# Patient Record
Sex: FEMALE | Race: WHITE
[De-identification: names, ages, dates, MRNs, and addresses within clinical notes are randomized per-mention and may not be internally consistent; named-entity substitution may affect disease eponyms.]

---

## 2017-01-25 ENCOUNTER — HOSPITAL ENCOUNTER (OUTPATIENT)
Dept: HOSPITAL 53 - M SFHCPLAZ | Age: 67
End: 2017-01-25
Attending: DERMATOLOGY
Payer: COMMERCIAL

## 2017-01-25 DIAGNOSIS — L57.0: Primary | ICD-10-CM

## 2017-05-03 ENCOUNTER — HOSPITAL ENCOUNTER (OUTPATIENT)
Dept: HOSPITAL 53 - M WHC | Age: 67
End: 2017-05-03
Payer: COMMERCIAL

## 2017-05-03 DIAGNOSIS — M85.9: ICD-10-CM

## 2017-05-03 DIAGNOSIS — C50.919: Primary | ICD-10-CM

## 2017-05-04 NOTE — DEXA
AP SPINE   L1 - L4   1.222   0.2      1.5

LT FEMUR   TOTAL   1.152   1.1      2.2

RT FEMUR   TOTAL   1.186   1.4      2.5

TOTAL BODY   TOTAL

OTHER



DUAL FEMUR FRAX* ASSESSMENT

Risk factors:               None.

10 year probability of fracture

Major osteoporotic fracture            5.9 %

Hip fracture               0.1 %



COMMENTS:

Normal bone densitometry of the spine and hips.

The decreased density of the spine does represent a significant change.

The decreased density of the left hip does represent a significant change.

The decreased density of the right hip does represent a significant change.

The density of the spine has decreased 2.6% since the initial exam on 12/30/
2003.

The spine density has decreased 4.8% since the most recent exam on 02/02/2012.

The density of the left hip has decreased 6.9% since the initial exam on 12/30/
2003.

The density of the left hip has decreased 9.1% since the most recent exam on 02/
02/2012.

The density of the right hip has decreased 1.6% since the initial exam on 12/30/
2003.

The density of the right hip has decreased 6.7% since the most recent exam on 02
/02/2012.



FOLLOW-UP:

Recommendation for the next bone density exam: 5 years.
[facility]IT[place]

## 2017-06-08 ENCOUNTER — HOSPITAL ENCOUNTER (OUTPATIENT)
Dept: HOSPITAL 53 - M LAB REF | Age: 67
End: 2017-06-08
Attending: NURSE PRACTITIONER
Payer: COMMERCIAL

## 2017-06-08 DIAGNOSIS — N39.0: Primary | ICD-10-CM

## 2017-08-15 ENCOUNTER — HOSPITAL ENCOUNTER (OUTPATIENT)
Dept: HOSPITAL 53 - M RAD | Age: 67
End: 2017-08-15
Attending: NURSE PRACTITIONER
Payer: COMMERCIAL

## 2017-08-15 DIAGNOSIS — N95.0: Primary | ICD-10-CM

## 2017-08-15 NOTE — REP
Pelvic ultrasound with transabdominal and endovaginal imaging:

 

Comparison 06/10/2015.

 

The bladder is incompletely distended.

 

The uterus is anteverted and normal size measuring 6.7 x 2.3 x 3.9 cm.

 

The uterus has a subseptate configuration.  The endometrium is not thickened

measuring 1.9 mm and the right cornu and 1.4 mm in the left cornu.

 

The ovaries could not be visualized.  No adnexal masses are identified.

 

Impression:

 

There is no endometrial thickening.

 

The ovaries could not be visualized.

 

Otherwise, essentially negative pelvic ultrasound.  Sign taking

 

 

Signed by

González Robledo MD 08/15/2017 02:14 P

## 2017-08-25 ENCOUNTER — HOSPITAL ENCOUNTER (OUTPATIENT)
Dept: HOSPITAL 53 - M LAB REF | Age: 67
End: 2017-08-25
Attending: UROLOGY
Payer: COMMERCIAL

## 2017-08-25 DIAGNOSIS — R39.89: Primary | ICD-10-CM

## 2017-10-19 ENCOUNTER — HOSPITAL ENCOUNTER (OUTPATIENT)
Dept: HOSPITAL 53 - M LAB REF | Age: 67
End: 2017-10-19
Attending: INTERNAL MEDICINE
Payer: COMMERCIAL

## 2017-10-19 DIAGNOSIS — Z11.59: Primary | ICD-10-CM

## 2018-06-27 ENCOUNTER — HOSPITAL ENCOUNTER (OUTPATIENT)
Dept: HOSPITAL 53 - M SDC | Age: 68
Discharge: HOME | End: 2018-06-27
Attending: OPHTHALMOLOGY
Payer: COMMERCIAL

## 2018-06-27 DIAGNOSIS — Z85.3: ICD-10-CM

## 2018-06-27 DIAGNOSIS — H25.9: Primary | ICD-10-CM

## 2018-06-27 DIAGNOSIS — Z92.21: ICD-10-CM

## 2018-06-27 DIAGNOSIS — Z79.899: ICD-10-CM

## 2018-06-27 DIAGNOSIS — Z92.3: ICD-10-CM

## 2018-06-27 PROCEDURE — 66984 XCAPSL CTRC RMVL W/O ECP: CPT

## 2018-06-27 RX ADMIN — Medication 1 EA: at 12:01

## 2018-06-27 RX ADMIN — LIDOCAINE HYDROCHLORIDE 1 ML: 10 INJECTION, SOLUTION EPIDURAL; INFILTRATION; INTRACAUDAL; PERINEURAL at 12:00

## 2018-06-27 RX ADMIN — Medication 1 EA: at 12:04

## 2018-06-27 RX ADMIN — PHENYLEPHRINE HYDROCHLORIDE 1 DROP: 25 SOLUTION/ DROPS OPHTHALMIC at 10:32

## 2018-06-27 RX ADMIN — OFLOXACIN 1 DROP: 3 SOLUTION/ DROPS OPHTHALMIC at 10:32

## 2018-06-27 RX ADMIN — POVIDONE-IODINE 1 DROP: 5 SOLUTION OPHTHALMIC at 11:57

## 2018-06-27 RX ADMIN — CYCLOPENTOLATE HYDROCHLORIDE 1 DROP: 20 SOLUTION/ DROPS OPHTHALMIC at 10:32

## 2018-06-27 RX ADMIN — TRIAMCINOLONE ACETONIDE 1 MG: 40 INJECTION, SUSPENSION OPHTHALMIC at 12:04

## 2018-06-27 RX ADMIN — ACETAZOLAMIDE 1 MG: 500 CAPSULE, EXTENDED RELEASE ORAL at 12:41

## 2018-06-27 RX ADMIN — LIDOCAINE HYDROCHLORIDE 1 %: 35 GEL OPHTHALMIC at 10:32

## 2018-06-27 RX ADMIN — Medication 1 MG: at 12:04

## 2020-02-20 ENCOUNTER — HOSPITAL ENCOUNTER (OUTPATIENT)
Dept: HOSPITAL 53 - M SDC | Age: 70
LOS: 1 days | Discharge: HOME | End: 2020-02-21
Attending: UROLOGY
Payer: COMMERCIAL

## 2020-02-20 VITALS — DIASTOLIC BLOOD PRESSURE: 68 MMHG | SYSTOLIC BLOOD PRESSURE: 137 MMHG

## 2020-02-20 VITALS — WEIGHT: 153.8 LBS | HEIGHT: 65 IN | BODY MASS INDEX: 25.62 KG/M2

## 2020-02-20 VITALS — SYSTOLIC BLOOD PRESSURE: 126 MMHG | DIASTOLIC BLOOD PRESSURE: 59 MMHG

## 2020-02-20 VITALS — SYSTOLIC BLOOD PRESSURE: 161 MMHG | DIASTOLIC BLOOD PRESSURE: 75 MMHG

## 2020-02-20 VITALS — DIASTOLIC BLOOD PRESSURE: 75 MMHG | SYSTOLIC BLOOD PRESSURE: 139 MMHG

## 2020-02-20 VITALS — SYSTOLIC BLOOD PRESSURE: 148 MMHG | DIASTOLIC BLOOD PRESSURE: 78 MMHG

## 2020-02-20 VITALS — DIASTOLIC BLOOD PRESSURE: 72 MMHG | SYSTOLIC BLOOD PRESSURE: 153 MMHG

## 2020-02-20 VITALS — DIASTOLIC BLOOD PRESSURE: 76 MMHG | SYSTOLIC BLOOD PRESSURE: 158 MMHG

## 2020-02-20 DIAGNOSIS — Z92.3: ICD-10-CM

## 2020-02-20 DIAGNOSIS — N72: ICD-10-CM

## 2020-02-20 DIAGNOSIS — Z79.899: ICD-10-CM

## 2020-02-20 DIAGNOSIS — Z85.3: ICD-10-CM

## 2020-02-20 DIAGNOSIS — N81.9: Primary | ICD-10-CM

## 2020-02-20 DIAGNOSIS — Z92.21: ICD-10-CM

## 2020-02-20 LAB
HCT VFR BLD AUTO: 40 % (ref 36–47)
HGB BLD-MCNC: 12.8 G/DL (ref 12–15.5)
MCH RBC QN AUTO: 32.2 PG (ref 27–33)
MCHC RBC AUTO-ENTMCNC: 32 G/DL (ref 32–36.5)
MCV RBC AUTO: 100.5 FL (ref 80–96)
PLATELET # BLD AUTO: 205 10^3/UL (ref 150–450)
RBC # BLD AUTO: 3.98 10^6/UL (ref 4–5.4)
WBC # BLD AUTO: 5 10^3/UL (ref 4–10)

## 2020-02-20 PROCEDURE — 57282 COLPOPEXY EXTRAPERITONEAL: CPT

## 2020-02-20 PROCEDURE — 36415 COLL VENOUS BLD VENIPUNCTURE: CPT

## 2020-02-20 PROCEDURE — 88305 TISSUE EXAM BY PATHOLOGIST: CPT

## 2020-02-20 PROCEDURE — 96361 HYDRATE IV INFUSION ADD-ON: CPT

## 2020-02-20 PROCEDURE — 85027 COMPLETE CBC AUTOMATED: CPT

## 2020-02-20 PROCEDURE — 86900 BLOOD TYPING SEROLOGIC ABO: CPT

## 2020-02-20 PROCEDURE — 86901 BLOOD TYPING SEROLOGIC RH(D): CPT

## 2020-02-20 PROCEDURE — 57260 CMBN ANT PST COLPRHY: CPT

## 2020-02-20 PROCEDURE — 96374 THER/PROPH/DIAG INJ IV PUSH: CPT

## 2020-02-20 PROCEDURE — 86850 RBC ANTIBODY SCREEN: CPT

## 2020-02-20 PROCEDURE — 58262 VAG HYST INCLUDING T/O: CPT

## 2020-02-20 RX ADMIN — SODIUM CHLORIDE, POTASSIUM CHLORIDE, SODIUM LACTATE AND CALCIUM CHLORIDE SCH MLS/HR: 600; 310; 30; 20 INJECTION, SOLUTION INTRAVENOUS at 18:48

## 2020-02-20 RX ADMIN — SODIUM CHLORIDE, POTASSIUM CHLORIDE, SODIUM LACTATE AND CALCIUM CHLORIDE SCH MLS/HR: 600; 310; 30; 20 INJECTION, SOLUTION INTRAVENOUS at 11:30

## 2020-02-20 NOTE — RO
DATE OF PROCEDURE:  02/20/2020

 

PREOPERATIVE DIAGNOSIS:  Symptomatic pelvic prolapse, failed pessary.

 

POSTOPERATIVE DIAGNOSIS: Symptomatic pelvic prolapse, failed pessary.

 

PROCEDURE: Total vaginal hysterectomy with bilateral salpingo-oophorectomy,

sacrospinous suspension using Anchorsure, anterior and posterior repair and

cystourethroscopy.

 

SURGEON: Dr. Mahnaz Acevedo

 

ASSISTANT: None.

 

ANESTHESIA: General endotracheal anesthesia.

 

DESCRIPTION OF PROCEDURE:  Virginia was brought to the operating room where

sufficient general endotracheal anesthesia was induced; of course, the pessary

was removed, and she was prepped, draped and positioned in the usual sterile

fashion. The cervix was grasped with a single-tooth tenaculum anteriorly and

posteriorly, the bladder was emptied. And with retractors in place, a

circumferential incision was made around the base of the cervix, and it was

carefully dissected away from the cardinal ligaments, which were isolated,

clamped, transected, and ligated using De Hernandez clamps, which were used

throughout this portion of the case and #0 Vicryl suture, which was similarly

used throughout this portion of the case.

 

The uterosacrals were then clamped, transected and ligated and posterior

resection of the peritoneum entered, and the dissection continued anteriorly to

free the uterus. There was considerable redundant tissue consistent with the

patient's longstanding prolapse, but no surprises. With the anterior reflection

of the peritoneum dissected and a bladder flap created and the bladder retracted

away, the uterine vasculature was then carefully clamped, transected, and ligated

along the lateral aspect of the uterus until they could be delivered. We then had

[place] clamps and used those to bring down the tubes. Evidence of a previous

tubal ligation was present, and the ovaries were small and atrophic, but they

were brought down. There was a small cyst on the left side; this too was brought

down and De Hernandez clamps used to clamp the pedicles and #0 Vicryl used to

ligate them. Good hemostasis was confirmed bilaterally, and we were able to get

both ovaries and tubes.

 

I then carefully evaluated the pedicles and closed the peritoneum in a running

locked stitch, and then dissected the anterior paravesical tissues away from the

vagina apically and did an anterior repair with a modified Todd's.  And then

resected some of the redundant tissue anteriorly before we even brought it up

because using [place] clamps, we were able to lift the tissue to the sacrospinous

ligament and see that there was a redundancy there. We then turned our attention

posteriorly. Dissected posteriorly to the sacrospinous ligament and freed the

tissues there. There was also a large defect toward the introitus and a

separation of the rectovaginal septum from the perineal body. But at this point,

we were working apically.  We dissected to the sacrospinous ligament, and then we

used Anchorsure anchors. We placed two Anchorsure anchors. Each of those anchors

had two #2-0 Maxon sutures so that we had four sutures to work with, and we did a

four-point apical support of the vagina coming through and through the tissues,

which had already been dissected away from the rectovaginal septum and the

underlying paravesical tissues, as already noted.  We then used the Powers needle

for those through-and-through stitches to the vaginal epithelium, and then having

four of those in place, elevated them to the sacrospinous ligament, single throws

each. Put those under some tension to hold those throws in place and then did the

cystourethroscopy. We could see puckering anteriorly where the cystocele had

already been supported, and there was no evidence of injury to the bladder or

suture in place. There were normal jets of urine from both ureters and photo was

taken in an effort to document this, but with the Pyridium the photos are a

little bit dark because the ureters were jetting right into the camera. But there

was no evidence of bladder or ureter injury. There was good evidence of support.

The cystourethroscopy was completed after we had emptied the bladder again. Then,

we finished the rest of the throws on the Maxon sutures and, of course, the

vaginal cuff itself was closed with #0 Vicryl.

 

Attention then turned anteriorly. There was still a little midline defect, so

made an anterior incision with the scalpel, deep stitches of #2-0 Vicryl and then

superficial stitches in the vaginal epithelium of #2-0 Vicryl to close the

defect. And then we turned our attention posteriorly. Again, there was a fairly

broad and then a right-sided defect in the rectovaginal septum, as well as a

separation from the perineal body. So, we removed an inverted triangle of tissue

from the perineum and then dissected the vaginal epithelium away from the

underlying rectovaginal tissues, and then dissected even further out towards the

right side to try to reconnect those tissues and correct that sort of right

paravaginal defect, but it was posterior not anterior. And we did deep tissue

support vaginally with #2-0 Vicryl, removed the redundant vaginal epithelium, and

closed the vaginal wound with #2-0 Vicryl as well at the perineal body. We

resutured the rectovaginal septum to the perineal body deeply and then also

re-supported the perineal body itself with #0 Vicryl in that area. Then, we used

#2-0 Vicryl to close the rest of the defect, having resupported the tissues, and

then the procedure was ended. Stoll was placed postop. There was no need for

packing.

 

Estimated blood loss for the procedure was 100 mL.

 

Fluid replacement was crystalloid.

 

Complications: None.

 

Condition and Disposition:  Virginia tolerated the procedure well and was

recovering in the recovery room in good condition.

## 2020-02-21 VITALS — DIASTOLIC BLOOD PRESSURE: 70 MMHG | SYSTOLIC BLOOD PRESSURE: 136 MMHG

## 2020-02-21 VITALS — SYSTOLIC BLOOD PRESSURE: 123 MMHG | DIASTOLIC BLOOD PRESSURE: 60 MMHG

## 2020-02-21 VITALS — DIASTOLIC BLOOD PRESSURE: 70 MMHG | SYSTOLIC BLOOD PRESSURE: 130 MMHG

## 2020-02-21 LAB
HCT VFR BLD AUTO: 26 % (ref 36–47)
HGB BLD-MCNC: 8.5 G/DL (ref 12–15.5)
MCH RBC QN AUTO: 32.7 PG (ref 27–33)
MCHC RBC AUTO-ENTMCNC: 32.7 G/DL (ref 32–36.5)
MCV RBC AUTO: 100 FL (ref 80–96)
PLATELET # BLD AUTO: 168 10^3/UL (ref 150–450)
RBC # BLD AUTO: 2.6 10^6/UL (ref 4–5.4)
WBC # BLD AUTO: 9.7 10^3/UL (ref 4–10)

## 2020-02-21 RX ADMIN — SODIUM CHLORIDE, POTASSIUM CHLORIDE, SODIUM LACTATE AND CALCIUM CHLORIDE SCH MLS/HR: 600; 310; 30; 20 INJECTION, SOLUTION INTRAVENOUS at 03:01

## 2020-03-06 ENCOUNTER — HOSPITAL ENCOUNTER (OUTPATIENT)
Dept: HOSPITAL 53 - M LAB REF | Age: 70
End: 2020-03-06
Attending: UROLOGY
Payer: COMMERCIAL

## 2020-03-06 DIAGNOSIS — N39.0: Primary | ICD-10-CM

## 2020-03-06 LAB
APPEARANCE UR: (no result)
BACTERIA UR QL AUTO: (no result)
BILIRUB UR QL STRIP.AUTO: NEGATIVE
GLUCOSE UR QL STRIP.AUTO: NEGATIVE MG/DL
HGB UR QL STRIP.AUTO: (no result)
KETONES UR QL STRIP.AUTO: NEGATIVE MG/DL
LEUKOCYTE ESTERASE UR QL STRIP.AUTO: (no result)
MUCOUS THREADS URNS QL MICRO: (no result)
NITRITE UR QL STRIP.AUTO: NEGATIVE
PH UR STRIP.AUTO: 5 UNITS (ref 5–9)
PROT UR QL STRIP.AUTO: NEGATIVE MG/DL
RBC # UR AUTO: 22 /HPF (ref 0–3)
SP GR UR STRIP.AUTO: 1.02 (ref 1–1.03)
SQUAMOUS #/AREA URNS AUTO: 0 /HPF (ref 0–6)
UROBILINOGEN UR QL STRIP.AUTO: 0.2 MG/DL (ref 0–2)
WBC #/AREA URNS AUTO: 13 /HPF (ref 0–3)

## 2021-03-17 ENCOUNTER — HOSPITAL ENCOUNTER (OUTPATIENT)
Dept: HOSPITAL 53 - M LAB REF | Age: 71
End: 2021-03-17
Attending: PHYSICIAN ASSISTANT
Payer: COMMERCIAL

## 2021-03-17 DIAGNOSIS — C44.729: Primary | ICD-10-CM

## 2021-03-17 DIAGNOSIS — D48.5: ICD-10-CM

## 2021-04-20 ENCOUNTER — HOSPITAL ENCOUNTER (OUTPATIENT)
Dept: HOSPITAL 53 - M LAB REF | Age: 71
End: 2021-04-20
Attending: DERMATOLOGY
Payer: COMMERCIAL

## 2021-04-20 DIAGNOSIS — T14.90XD: Primary | ICD-10-CM

## 2021-05-18 ENCOUNTER — HOSPITAL ENCOUNTER (OUTPATIENT)
Dept: HOSPITAL 53 - M LAB REF | Age: 71
End: 2021-05-18
Attending: DERMATOLOGY
Payer: COMMERCIAL

## 2021-05-18 DIAGNOSIS — T14.90XD: Primary | ICD-10-CM

## 2023-09-05 ENCOUNTER — HOSPITAL ENCOUNTER (OUTPATIENT)
Dept: HOSPITAL 53 - M WHC | Age: 73
End: 2023-09-05
Attending: INTERNAL MEDICINE
Payer: MEDICARE

## 2023-09-05 DIAGNOSIS — Z13.820: Primary | ICD-10-CM

## 2023-09-05 DIAGNOSIS — M85.88: ICD-10-CM

## 2023-09-11 ENCOUNTER — HOSPITAL ENCOUNTER (OUTPATIENT)
Dept: HOSPITAL 53 - M RAD | Age: 73
End: 2023-09-11
Attending: PHYSICIAN ASSISTANT
Payer: MEDICARE

## 2023-09-11 DIAGNOSIS — M75.41: Primary | ICD-10-CM

## 2023-09-11 PROCEDURE — 23350 INJECTION FOR SHOULDER X-RAY: CPT

## 2023-09-11 PROCEDURE — 73223 MRI JOINT UPR EXTR W/O&W/DYE: CPT

## 2023-09-11 PROCEDURE — 77002 NEEDLE LOCALIZATION BY XRAY: CPT

## 2024-06-03 ENCOUNTER — HOSPITAL ENCOUNTER (OUTPATIENT)
Dept: HOSPITAL 53 - M OPP | Age: 74
Discharge: HOME | End: 2024-06-03
Attending: INTERNAL MEDICINE
Payer: MEDICARE

## 2024-06-03 VITALS — BODY MASS INDEX: 24.19 KG/M2 | WEIGHT: 145.2 LBS | HEIGHT: 65 IN

## 2024-06-03 VITALS — OXYGEN SATURATION: 99 % | TEMPERATURE: 96.4 F | SYSTOLIC BLOOD PRESSURE: 122 MMHG | DIASTOLIC BLOOD PRESSURE: 58 MMHG

## 2024-06-03 DIAGNOSIS — Z12.11: Primary | ICD-10-CM

## 2024-06-03 DIAGNOSIS — K64.0: ICD-10-CM

## 2024-06-03 DIAGNOSIS — Z79.818: ICD-10-CM

## 2024-06-03 DIAGNOSIS — Z79.810: ICD-10-CM

## 2024-06-03 DIAGNOSIS — Z79.1: ICD-10-CM
